# Patient Record
Sex: FEMALE | Race: WHITE | HISPANIC OR LATINO | Employment: FULL TIME | URBAN - METROPOLITAN AREA
[De-identification: names, ages, dates, MRNs, and addresses within clinical notes are randomized per-mention and may not be internally consistent; named-entity substitution may affect disease eponyms.]

---

## 2024-04-10 ENCOUNTER — OFFICE VISIT (OUTPATIENT)
Dept: URGENT CARE | Facility: URGENT CARE | Age: 54
End: 2024-04-10
Payer: COMMERCIAL

## 2024-04-10 VITALS
SYSTOLIC BLOOD PRESSURE: 138 MMHG | DIASTOLIC BLOOD PRESSURE: 82 MMHG | WEIGHT: 162 LBS | RESPIRATION RATE: 16 BRPM | HEART RATE: 65 BPM | TEMPERATURE: 97 F | OXYGEN SATURATION: 99 %

## 2024-04-10 DIAGNOSIS — T15.92XA FOREIGN BODY OF LEFT EYE, INITIAL ENCOUNTER: Primary | ICD-10-CM

## 2024-04-10 PROCEDURE — 99203 OFFICE O/P NEW LOW 30 MIN: CPT | Performed by: NURSE PRACTITIONER

## 2024-04-10 NOTE — PROGRESS NOTES
Assessment & Plan      Diagnosis Comments   1. Foreign body of left eye, initial encounter  Unable to remove small foreign object in left eye in clinic.  Patient was scheduled with [facility] for this afternoon to have further evaluation and treatment completed discussed this with  patient and her daughter.  Daughter is interpreting for patient patient declined clinic .            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  Denies vision changes states is somewhat painful.    Review of Systems  Constitutional, HEENT, cardiovascular, pulmonary, gi and gu systems are negative, except as otherwise noted.      Objective    /82   Pulse 65   Temp 97  F (36.1  C)   Resp 16   Wt 73.5 kg (162 lb)   SpO2 99%   Physical Exam   GENERAL: alert and no distress  EYES: PERRL, EOMI, and left eye examined after proparacaine eyedrops administered fluorescein was used with Woods lamp small foreign object appeared base of the iris and sclera junction   NECK: no adenopathy, no asymmetry, masses, or scars  CV: regular rate and rhythm, normal S1 S2, no S3 or S4, no murmur, click or rub, no peripheral edema  SKIN: no suspicious lesions or rashes

## 2024-04-10 NOTE — PATIENT INSTRUCTIONS
Please present to [facility]   [id number]   [facility] [address]  [phone]